# Patient Record
Sex: FEMALE | Race: WHITE | NOT HISPANIC OR LATINO | Employment: FULL TIME | URBAN - METROPOLITAN AREA
[De-identification: names, ages, dates, MRNs, and addresses within clinical notes are randomized per-mention and may not be internally consistent; named-entity substitution may affect disease eponyms.]

---

## 2017-03-21 ENCOUNTER — ALLSCRIPTS OFFICE VISIT (OUTPATIENT)
Dept: OTHER | Facility: OTHER | Age: 32
End: 2017-03-21

## 2017-08-09 ENCOUNTER — ALLSCRIPTS OFFICE VISIT (OUTPATIENT)
Dept: OTHER | Facility: OTHER | Age: 32
End: 2017-08-09

## 2017-09-27 ENCOUNTER — ALLSCRIPTS OFFICE VISIT (OUTPATIENT)
Dept: OTHER | Facility: OTHER | Age: 32
End: 2017-09-27

## 2018-01-11 NOTE — MISCELLANEOUS
Provider Comments  Provider Comments:   Spoke with patient she will c/b when she can reschedule the appointment        Signatures   Electronically signed by : Coby Triana DO; Sep 27 2017  2:28PM EST                       (Author)

## 2018-01-12 VITALS
TEMPERATURE: 98.3 F | SYSTOLIC BLOOD PRESSURE: 100 MMHG | DIASTOLIC BLOOD PRESSURE: 70 MMHG | WEIGHT: 123.4 LBS | HEIGHT: 65 IN | BODY MASS INDEX: 20.56 KG/M2 | RESPIRATION RATE: 16 BRPM | HEART RATE: 84 BPM

## 2018-01-13 NOTE — PROGRESS NOTES
Assessment    1  Generalized anxiety disorder (300 02) (F41 1)    Plan  Generalized anxiety disorder    · PARoxetine HCl - 20 MG Oral Tablet; TAKE 1 TABLET DAILY    Discussion/Summary    1  Generalized Anxiety Disorder: I will continue the patient on Paroxetine 20 mg PO daily and Alprazolam 0 25 mg PO two tablets in the evening  I provided the patient with a list of child psychiatrists at [facility] that she could take her son for evaluation  2  Return to office in 2 months  The treatment plan was reviewed with the patient/guardian  The patient/guardian understands and agrees with the treatment plan   The patient was counseled regarding instructions for management, prognosis, patient and family education, impressions  total time of encounter was 15 minutes and 6 minutes was spent counseling  Chief Complaint  Patient states that she is feeling much better  bh/lpn   Patient is here today for follow up of chronic conditions described in HPI  History of Present Illness  The patient is a 27year old female who presents to the office for a follow-up of anxiety  The patient reports that her anxiety seems to be better controlled by taking her Paroxetine and Alprazolam at bedtime so she does not feel drowsy in the morning  The patient requests information on child psychiatrists/behavioral therapists because her son was still having anxiety over hearing footsteps and was acting out one morning while trying to get dressed in the morning  Pain Assessment   the patient states they do not have pain  The pain radiates to the None  Prefered Language is  [place]  Primary Language is  English  Review of Systems    Constitutional: not feeling tired  Cardiovascular: no chest pain and no palpitations  Psychiatric: no anxiety and no depression  ROS reviewed  Past Medical History    The active problems and past medical history were reviewed and updated today        Surgical History    The surgical history was reviewed and updated today  Family History    The family history was reviewed and updated today  Social History  The social history was reviewed and is unchanged  Current Meds    The medication list was reviewed and updated today  Vitals  Vital Signs [Data Includes: Current Encounter]    Recorded: 21Jan2016 12:54PM   Temperature 97 7 F   Heart Rate 90   Respiration 16   Systolic 604   Diastolic 60   Height 5 ft 4 in   Weight 125 lb    BMI Calculated 21 46   BSA Calculated 1 6     Physical Exam    Constitutional   General appearance: No acute distress, well appearing and well nourished      Psychiatric   Orientation to person, place, and time: Normal     Mood and affect: Normal          Signatures   Electronically signed by : Alyse Khanna DO; Jan 22 2016  8:18AM EST                       (Author)

## 2018-01-16 NOTE — PROGRESS NOTES
Assessment    1  Dysuria (788 1) (R30 0)    Plan  Burning with urination    · Urine Dip Automated- POC; Status:Complete - Retrospective Authorization;   Done:  84ZZG5446 10:16AM  Burning with urination, Dysuria    · (1) URINALYSIS w URINE C/S REFLEX (will reflex a microscopy if leukocytes, occult  blood, or nitrites are not within normal limits); Status:Active - Retrospective Authorization; Requested for:25Jan2016;   Dysuria    · SMZ-TMP -160 MG Oral Tablet; take 1 tablet twice daily with food    Discussion/Summary    #1 dysuria  denies allergy to bactrim  discussed probiotics, may lower risk for diarrhea, c diff  discussed to increase fluids  cranberry juice can prevent, not treat  The patient was counseled regarding instructions for management  The treatment plan was reviewed with the patient/guardian  The patient/guardian understands and agrees with the treatment plan      Chief Complaint  Patient with frequent urination that is painful  bh/lpn      History of Present Illness  HPI: 30y/o wf presents to office for dysuria  started 4am today - taken uricalm today, feeling better  Review of Systems    Constitutional: no fever and no chills  Gastrointestinal: no abdominal pain, no nausea and no vomiting  Active Problems    1  Flank pain (789 09) (R10 9)   2  Flu syndrome (487 1) (J11 1)   3  Generalized anxiety disorder (300 02) (F41 1)   4  Rib fracture (807 00) (S22 39XA)   5  Rib injury   6  Screening for HIV (human immunodeficiency virus) (V73 89) (Z11 4)    Family History    1  Family history of hypertension (V17 49) (Z82 49)    Social History    · Caffeine use (V49 89) (F15 90)   · Former smoker (I81 20) (U07 688)   · Never     Current Meds   1  ALPRAZolam 0 25 MG Oral Tablet; TAKE 2 TABLET Every morning; Therapy: 40FSY6244 to (Evaluate:02Jan2016); Last Rx:[id number] Ordered   2  PARoxetine HCl - 20 MG Oral Tablet; TAKE 1 TABLET DAILY;    Therapy: 64TVL0599 to (Latasha Siegel)  Requested for: 21Jan2016; Last   Rx:21Jan2016 Ordered    Allergies    1  No Known Drug Allergies    Vitals   Recorded: 41LEF3349 10:09AM   Temperature 98 6 F   Heart Rate 82   Respiration 16   Systolic 218   Diastolic 64   Height 5 ft 5 in   Weight 124 lb    BMI Calculated 20 63   BSA Calculated 1 61     Physical Exam    Constitutional   General appearance: No acute distress, well appearing and well nourished  Pulmonary   Respiratory effort: No increased work of breathing or signs of respiratory distress  Respiratory rate: normal  Assessment of respiratory effort revealed normal rhythm and effort, no accessory muscle use, no air hunger and no distress  Psychiatric   Judgment and insight: Normal     Orientation to person, place, and time: Normal     Recent and remote memory: Intact      Mood and affect: Normal        Results/Data  Urine Dip Automated- POC [id number] 10:16AM Hanny Monzon     Test Name Result Flag Reference   Color Yellow     Clarity Transparent     Leukocytes 500     Nitrite pos     Blood 50     Bilirubin 1     Urobilinogen 4     Protein 30     Ph 8     Specific [place] 1 005     Ketone neg     Glucose norm         Signatures   Electronically signed by : NATALI Ritter ; Jan 25 2016  5:33PM EST                       (Author)

## 2018-01-18 NOTE — PROGRESS NOTES
Assessment    1  Encounter for preventive health examination (V70 0) (Z00 00)   2  Body mass index (BMI) 21 0-21 9, adult (V85 1) (Z68 21)   3  Generalized anxiety disorder (300 02) (F41 1)   4  Exposure to hepatitis C (V01 79) (Z20 5)    Plan  Exposure to hepatitis C, Generalized anxiety disorder, Screening cholesterol level,  Screening for deficiency anemia, Screening for diabetes mellitus    · (1) TSH; Status:Active; Requested for:21Mar2017;   Exposure to hepatitis C, Screening cholesterol level, Screening for deficiency anemia,  Screening for diabetes mellitus    · (1) CBC/PLT/DIFF; Status:Active; Requested for:21Mar2017;    · (1) CHRONIC HEPATITIS PANEL; Status:Active; Requested for:21Mar2017;    · (1) COMPREHENSIVE METABOLIC PANEL; Status:Active; Requested for:21Mar2017;    · (1) HEP C RNA PCR, QUANTITATIVE; Status:Active; Requested for:21Mar2017;    · (1) LIPID PANEL FASTING W DIRECT LDL REFLEX; Status:Active; Requested  for:21Mar2017;    · (1) URINALYSIS (will reflex a microscopy if leukocytes, occult blood, protein or nitrites  are not within normal limits); Status:Active; Requested for:21Mar2017;    · Follow Up Next Visit for Health Maintenance Exam Evaluation and Treatment  Follow-up   Status: Complete  Done: [id number]  Generalized anxiety disorder    · ALPRAZolam 0 25 MG Oral Tablet; take 1 tablet daily prn   · PARoxetine HCl - 20 MG Oral Tablet; Take 1 tablet daily    Discussion/Summary  health maintenance visit Currently, she eats a healthy diet and has an adequate exercise regimen  the risks and benefits of cervical cancer screening were discussed will schedule appt Breast cancer screening: monthly self breast exam was advised  Patient discussion: discussed with the patient  Will make an appt for pap  bw script given  will start on paxil and give small amount of xanax to use until paxil takes effect  follow up with psych     The patient was counseled regarding instructions for management, risk factor reductions, patient and family education, impressions, importance of compliance with treatment  Possible side effects of new medications were reviewed with the patient/guardian today  The treatment plan was reviewed with the patient/guardian  The patient/guardian understands and agrees with the treatment plan      Chief Complaint  patient presenting for her annual physical sl/cma      History of Present Illness  HM, Adult Female: The patient is being seen for a health maintenance evaluation  General Health: The patient's health since the last visit is described as good  Immunizations status:  tdap is UTD per pt  Lifestyle:  She consumes a diverse and healthy diet  She does not have any weight concerns  She does not exercise regularly  She does not use tobacco  She consumes alcohol  She reports occasional alcohol use  Reproductive health:  last pap was approx 4 years ago  has mirena  Screening: cancer screening reviewed and updated  metabolic screening reviewed and updated  risk screening reviewed and updated  Additional History:  pt had + test for hep c and pt stated that "my body fought it off "   would like retesting     having anxiety  would like xanax  was on both paxil and xanax  feels generalized anxiety  has appt with [facility] with psych  Review of Systems    Constitutional: not feeling poorly and not feeling tired  Eyes: no eyesight problems  ENT: no hearing loss  Cardiovascular: no chest pain and no palpitations  Respiratory: no shortness of breath, no cough and no shortness of breath during exertion  Gastrointestinal: no change  Genitourinary: no change  Musculoskeletal: no arthralgias and no myalgias  Neurological: no headache  Psychiatric: anxiety  Active Problems    1   Generalized anxiety disorder (300 02) (F41 1)    Past Medical History    · History of Acute pharyngitis, unspecified etiology (462) (J02 9)   · History of Cough (786 2) (R05)   · History of fracture of rib (V15 51) (Z87 81)   · History of Rib injury   · History of URTI (acute upper respiratory infection) (465 9) (J06 9)    Family History  Mother    · Family history of hypertension (V17 49) (Z82 49)  Paternal Aunt    · Family history of malignant neoplasm of female breast (V16 3) (Z80 3)    Social History    · Caffeine use (V49 89) (F15 90)   · Former smoker (X26 20) (U51 402)   · Never     Allergies    1  No Known Drug Allergies    Vitals   Recorded: 21Mar2017 12:55PM   Temperature 97 8 F   Heart Rate 87   Respiration 16   Systolic 336   Diastolic 80   Height 5 ft 5 in   Weight 130 lb 6 4 oz   BMI Calculated 21 7   BSA Calculated 1 65     Physical Exam    Constitutional   General appearance: No acute distress, well appearing and well nourished  Head and Face   Head and face: Normal     Eyes   Conjunctiva and lids: No swelling, erythema or discharge  Pupils and irises: Equal, round, reactive to light  Ears, Nose, Mouth, and Throat   External inspection of ears and nose: Normal     Otoscopic examination: Tympanic membranes translucent with normal light reflex  Canals patent without erythema  Lips, teeth, and gums: Normal, good dentition  Oropharynx: Normal with no erythema, edema, exudate or lesions  Neck   Neck: Supple, symmetric, trachea midline, no masses  Thyroid: Normal, no thyromegaly  Pulmonary   Respiratory effort: No increased work of breathing or signs of respiratory distress  Auscultation of lungs: Clear to auscultation  Cardiovascular   Auscultation of heart: Normal rate and rhythm, normal S1 and S2, no murmurs  Peripheral vascular exam: Normal     Examination of extremities for edema and/or varicosities: Normal     Abdomen   Abdomen: Non-tender, no masses  Liver and spleen: No hepatomegaly or splenomegaly  Lymphatic   Palpation of lymph nodes in neck: No lymphadenopathy      Musculoskeletal   Gait and station: Normal     Digits and nails: Normal without clubbing or cyanosis  Joints, bones, and muscles: Normal     Range of motion: Normal     Stability: Normal     Muscle strength/tone: Normal     Neurologic   Cranial nerves: Cranial nerves II-XII intact  Cortical function: Normal mental status  Reflexes: 2+ and symmetric  Psychiatric   Judgment and insight: Normal     Orientation to person, place, and time: Normal     Recent and remote memory: Intact  Mood and affect: Normal        Results/Data  PHQ-2 Adult Depression Screening 21Mar2017 01:25PM User, s     Test Name Result Flag Reference   PHQ-2 Adult Depression Score 0     Over the last two weeks, how often have you been bothered by any of the following problems?   Little interest or pleasure in doing things: Not at all - 0  Feeling down, depressed, or hopeless: Not at all - 0   PHQ-2 Adult Depression Screening Negative         Signatures   Electronically signed by : Edwina Rodríguez DO; Mar 21 2017  3:24PM EST                       (Author)

## 2018-01-22 VITALS
HEART RATE: 87 BPM | RESPIRATION RATE: 16 BRPM | SYSTOLIC BLOOD PRESSURE: 118 MMHG | DIASTOLIC BLOOD PRESSURE: 80 MMHG | TEMPERATURE: 97.8 F | WEIGHT: 130.4 LBS | HEIGHT: 65 IN | BODY MASS INDEX: 21.73 KG/M2

## 2018-03-05 ENCOUNTER — TELEPHONE (OUTPATIENT)
Dept: FAMILY MEDICINE CLINIC | Facility: CLINIC | Age: 33
End: 2018-03-05

## 2018-03-05 ENCOUNTER — OFFICE VISIT (OUTPATIENT)
Dept: FAMILY MEDICINE CLINIC | Facility: CLINIC | Age: 33
End: 2018-03-05
Payer: COMMERCIAL

## 2018-03-05 VITALS
HEIGHT: 65 IN | DIASTOLIC BLOOD PRESSURE: 66 MMHG | SYSTOLIC BLOOD PRESSURE: 110 MMHG | BODY MASS INDEX: 19.66 KG/M2 | HEART RATE: 84 BPM | WEIGHT: 118 LBS | TEMPERATURE: 98.4 F

## 2018-03-05 DIAGNOSIS — J06.9 ACUTE URI: Primary | ICD-10-CM

## 2018-03-05 PROCEDURE — 3008F BODY MASS INDEX DOCD: CPT | Performed by: NURSE PRACTITIONER

## 2018-03-05 PROCEDURE — 99213 OFFICE O/P EST LOW 20 MIN: CPT | Performed by: NURSE PRACTITIONER

## 2018-03-05 RX ORDER — GABAPENTIN 300 MG/1
CAPSULE ORAL
COMMUNITY
End: 2019-05-08 | Stop reason: ALTCHOICE

## 2018-03-05 NOTE — TELEPHONE ENCOUNTER
Patient wanted to know if you could call in Rx for TamiFlu for both her and her son? Please send Rx to [facility] in [place] [address]  Please call and let her know    Thanks

## 2018-03-05 NOTE — PATIENT INSTRUCTIONS
Upper Respiratory Infection   AMBULATORY CARE:   An upper respiratory infection  is also called a common cold  It can affect your nose, throat, ears, and sinuses  Common signs and symptoms include the following:  Cold symptoms are usually worst for the first 3 to 5 days  You may have any of the following:  · Runny or stuffy nose    · Sneezing and coughing    · Sore throat or hoarseness    · Red, watery, and sore eyes    · Fatigue     · Chills and fever    · Headache, body aches, or sore muscles  Seek care immediately if:   · You have chest pain or trouble breathing  Contact your healthcare provider if:   · You have a fever over 102ºF (39°C)  · Your sore throat gets worse or you see white or yellow spots in your throat  · Your symptoms get worse after 3 to 5 days or your cold is not better in 14 days  · You have a rash anywhere on your skin  · You have large, tender lumps in your neck  · You have thick, green or yellow drainage from your nose  · You cough up thick yellow, green, or bloody mucus  · You have vomiting for more than 24 hours and cannot keep fluids down  · You have a bad earache  · You have questions or concerns about your condition or care  Treatment for a cold: There is no cure for the common cold  Colds are caused by viruses and do not get better with antibiotics  Most people get better in 7 to 14 days  You may continue to cough for 2 to 3 weeks  The following may help decrease your symptoms:  · Decongestants  help reduce nasal congestion and help you breathe more easily  If you take decongestant pills, they may make you feel restless or not able to sleep  Do not use decongestant sprays for more than a few days  · Cough suppressants  help reduce coughing  Ask your healthcare provider which type of cough medicine is best for you  · NSAIDs , such as ibuprofen, help decrease swelling, pain, and fever   NSAIDs can cause stomach bleeding or kidney problems in certain people  If you take blood thinner medicine, always ask your healthcare provider if NSAIDs are safe for you  Always read the medicine label and follow directions  · Acetaminophen  decreases pain and fever  It is available without a doctor's order  Ask how much to take and how often to take it  Follow directions  Read the labels of all other medicines you are using to see if they also contain acetaminophen, or ask your doctor or pharmacist  Acetaminophen can cause liver damage if not taken correctly  Do not use more than 4 grams (4,000 milligrams) total of acetaminophen in one day  Manage your cold:   · Rest as much as possible  Slowly start to do more each day  · Drink more liquids as directed  Liquids will help thin and loosen mucus so you can cough it up  Liquids will also help prevent dehydration  Liquids that help prevent dehydration include water, fruit juice, and broth  Do not drink liquids that contain caffeine  Caffeine can increase your risk for dehydration  Ask your healthcare provider how much liquid to drink each day  · Soothe a sore throat  Gargle with warm salt water  This helps your sore throat feel better  Make salt water by dissolving ¼ teaspoon salt in 1 cup warm water  You may also suck on hard candy or throat lozenges  You may use a sore throat spray  · Use a humidifier or vaporizer  Use a cool mist humidifier or a vaporizer to increase air moisture in your home  This may make it easier for you to breathe and help decrease your cough  · Use saline nasal drops as directed  These help relieve congestion  · Apply petroleum-based jelly around the outside of your nostrils  This can decrease irritation from blowing your nose  · Do not smoke  Nicotine and other chemicals in cigarettes and cigars can make your symptoms worse  They can also cause infections such as bronchitis or pneumonia   Ask your healthcare provider for information if you currently smoke and need help to quit  E-cigarettes or smokeless tobacco still contain nicotine  Talk to your healthcare provider before you use these products  Prevent spreading your cold to others:   · Try to stay away from other people during the first 2 to 3 days of your cold when it is more easily spread  · Do not share food or drinks  · Do not share hand towels with household members  · Wash your hands often, especially after you blow your nose  Turn away from other people and cover your mouth and nose with a tissue when you sneeze or cough  Follow up with your healthcare provider as directed:  Write down your questions so you remember to ask them during your visits  © 2017 [facility] Information is for End User's use only and may not be sold, redistributed or otherwise used for commercial purposes  All illustrations and images included in CareNotes® are the copyrighted property of [employer]  or [employer] Maxwell  The above information is an  only  It is not intended as medical advice for individual conditions or treatments  Talk to your doctor, nurse or pharmacist before following any medical regimen to see if it is safe and effective for you

## 2018-03-05 NOTE — PROGRESS NOTES
Zayra Whitfield is a 28 y o  female who presents for evaluation of symptoms of a URI  Symptoms include no  fever and non productive cough  Denies nasal congestion, chest pain, myalgias, GI upset  Onset of symptoms was 1 days ago and has been stable since that time  Treatment to date: none  Son currently is ill with flu  The following portions of the patient's history were reviewed and updated as appropriate: allergies, current medications, past family history, past medical history, past social history, past surgical history and problem list     Review of Systems  Pertinent items are noted in HPI       Objective     /66 (BP Location: Left arm, Patient Position: Sitting, Cuff Size: Adult)   Pulse 84   Temp 98 4 °F (36 9 °C)   Ht 5' 5" (1 651 m)   Wt 53 5 kg (118 lb)   BMI 19 64 kg/m²   General appearance: alert and oriented, in no acute distress  Head: Normocephalic, without obvious abnormality, sinuses nontender to percussion  Ears: normal TM's and external ear canals both ears  Nose: no discharge, turbinates red  Throat: abnormal findings: mild oropharyngeal erythema  Lungs: clear to auscultation bilaterally  Heart: regular rate and rhythm, S1, S2 normal, no murmur, click, rub or gallop  Pulses: 2+ and symmetric  Lymph nodes: Cervical, supraclavicular, and axillary nodes normal   Neurologic: Grossly normal     Assessment/Plan     viral upper respiratory illness  Discussed diagnosis and treatment of URI  Discussed the importance of avoiding unnecessary antibiotic therapy  Suggested symptomatic OTC remedies  Nasal saline spray for congestion  Follow up as needed  Call in 7 days if symptoms aren't resolving  Lin Anguiano

## 2018-03-05 NOTE — LETTER
March 5, 2018     Patient: Jose Luis Infante   YOB: 1985   Date of Visit: 3/5/2018       To Whom it May Concern:    Dickerson Naty is under my professional care  She was seen in my office on 3/5/2018  She may return to work on 3/7/18  If you have any questions or concerns, please don't hesitate to call           Sincerely,          ROXANE Eubanks        CC: No Recipients

## 2019-04-18 ENCOUNTER — TELEPHONE (OUTPATIENT)
Dept: FAMILY MEDICINE CLINIC | Facility: CLINIC | Age: 34
End: 2019-04-18

## 2019-05-08 ENCOUNTER — APPOINTMENT (OUTPATIENT)
Dept: RADIOLOGY | Facility: CLINIC | Age: 34
End: 2019-05-08
Payer: COMMERCIAL

## 2019-05-08 ENCOUNTER — OFFICE VISIT (OUTPATIENT)
Dept: URGENT CARE | Facility: CLINIC | Age: 34
End: 2019-05-08
Payer: COMMERCIAL

## 2019-05-08 VITALS
TEMPERATURE: 98.3 F | BODY MASS INDEX: 21.48 KG/M2 | OXYGEN SATURATION: 100 % | WEIGHT: 121.2 LBS | SYSTOLIC BLOOD PRESSURE: 114 MMHG | HEART RATE: 97 BPM | HEIGHT: 63 IN | DIASTOLIC BLOOD PRESSURE: 70 MMHG | RESPIRATION RATE: 16 BRPM

## 2019-05-08 DIAGNOSIS — R07.81 RIB PAIN ON RIGHT SIDE: Primary | ICD-10-CM

## 2019-05-08 DIAGNOSIS — R07.81 RIB PAIN ON RIGHT SIDE: ICD-10-CM

## 2019-05-08 PROCEDURE — 71101 X-RAY EXAM UNILAT RIBS/CHEST: CPT

## 2019-05-08 PROCEDURE — 99213 OFFICE O/P EST LOW 20 MIN: CPT | Performed by: FAMILY MEDICINE

## 2019-05-08 RX ORDER — NAPROXEN 500 MG/1
500 TABLET ORAL 2 TIMES DAILY WITH MEALS
Qty: 14 TABLET | Refills: 0 | Status: SHIPPED | OUTPATIENT
Start: 2019-05-08 | End: 2020-03-24

## 2019-05-08 RX ORDER — BUPRENORPHINE 2 MG/1
2 TABLET SUBLINGUAL DAILY
COMMUNITY

## 2020-03-24 ENCOUNTER — TELEMEDICINE (OUTPATIENT)
Dept: FAMILY MEDICINE CLINIC | Facility: CLINIC | Age: 35
End: 2020-03-24
Payer: COMMERCIAL

## 2020-03-24 DIAGNOSIS — K52.9 GASTROENTERITIS: Primary | ICD-10-CM

## 2020-03-24 PROCEDURE — 99213 OFFICE O/P EST LOW 20 MIN: CPT | Performed by: FAMILY MEDICINE

## 2020-03-24 RX ORDER — BUPRENORPHINE HYDROCHLORIDE 8 MG/1
8 TABLET SUBLINGUAL 2 TIMES DAILY
COMMUNITY
Start: 2020-02-28

## 2020-03-24 NOTE — LETTER
March 24, 2020     Patient: Chris Echeverria   YOB: 1985   Date of Visit: 3/24/2020       To Whom it May Concern:    Kvng Kumar is under my professional care  She was seen by virtual visit on 3/24/2020  She may return to work on 3/25/2020  Pt was out of work since 3/23/2020    If you have any questions or concerns, please don't hesitate to call           Sincerely,          Charlotte Oswald MD        CC: No Recipients albuterol (PROVENTIL) (2.5 MG/3ML) 0.083% neb solution         Last Written Prescription Date:  1/9/20  Last Fill Quantity: 90 mL,   # refills: 0  Last Office Visit: 12/30/19  Future Office visit:       Routing refill request to provider for review/approval because:  Asthma Maintenance Inhalers - Anticholinergics Failed    Due to:     Asthma control assessment score within normal limits in last 6 months    Recent (6 mo) or future (30 days) visit within the authorizing provider's specialty    No flowsheet data found.

## 2020-03-24 NOTE — PROGRESS NOTES
Virtual Regular Visit    Problem List Items Addressed This Visit     None      Visit Diagnoses     Gastroenteritis    -  Primary      resolving   Ok to resume work tomorrow        Reason for visit is vomiting/diarrhea     Encounter provider Kindra Grant MD    Provider located at [address] [place] [id number]      Recent Visits  No visits were found meeting these conditions  Showing recent visits within past 7 days and meeting all other requirements     Future Appointments  No visits were found meeting these conditions  Showing future appointments within next 150 days and meeting all other requirements        After connecting through [employer], the patient was identified by name and date of birth  Ashleigh Patton was informed that this is a telemedicine visit and that the visit is being conducted through [employer]  which may not be secure and therefore, might not be HIPAA-compliant  My office door was closed  No one else was in the room  She acknowledged consent and understanding of privacy and security of the video platform  The patient has agreed to participate and understands they can discontinue the visit at any time  Doris Fenton is a 29 y o  female   Pt had vomiting and diarrhea x 48 h -  No fever, feeling better today -  Diarrhea improved a lot, able to keep fluids and some food down -  No therapy tried, needs clearance for work     Past Medical History:   Diagnosis Date    Substance abuse ([facility] [address] )        Past Surgical History:   Procedure Laterality Date    BREAST IMPLANT         Current Outpatient Medications   Medication Sig Dispense Refill    buprenorphine (SUBUTEX) 2 mg Place 2 mg under the tongue daily      buprenorphine (SUBUTEX) 8 mg 8 mg 2 (two) times a day       No current facility-administered medications for this visit           No Known Allergies    Review of Systems   Constitutional: Negative  HENT: Negative  Respiratory: Negative  Genitourinary: Negative  Physical Exam   Constitutional: She is oriented to person, place, and time  No distress  Pulmonary/Chest: Effort normal    Neurological: She is alert and oriented to person, place, and time  Skin: She is not diaphoretic  No pallor  I spent 10 minutes with the patient during this visit

## 2020-03-25 ENCOUNTER — TELEPHONE (OUTPATIENT)
Dept: FAMILY MEDICINE CLINIC | Facility: CLINIC | Age: 35
End: 2020-03-25

## 2020-03-25 NOTE — TELEPHONE ENCOUNTER
Patient is requesting an extension to her work excuse  Patient still not feeling better and would like to return tomorrow, 3/26/2020